# Patient Record
Sex: MALE | Race: WHITE | NOT HISPANIC OR LATINO
[De-identification: names, ages, dates, MRNs, and addresses within clinical notes are randomized per-mention and may not be internally consistent; named-entity substitution may affect disease eponyms.]

---

## 2019-03-19 PROBLEM — Z00.00 ENCOUNTER FOR PREVENTIVE HEALTH EXAMINATION: Status: ACTIVE | Noted: 2019-03-19

## 2019-05-14 ENCOUNTER — APPOINTMENT (OUTPATIENT)
Dept: VASCULAR SURGERY | Facility: HOSPITAL | Age: 79
End: 2019-05-14
Payer: MEDICARE

## 2019-05-14 PROCEDURE — 35301 RECHANNELING OF ARTERY: CPT | Mod: LT

## 2019-05-14 PROCEDURE — 35301 RECHANNELING OF ARTERY: CPT | Mod: 80,LT

## 2019-05-29 ENCOUNTER — APPOINTMENT (OUTPATIENT)
Dept: VASCULAR SURGERY | Facility: CLINIC | Age: 79
End: 2019-05-29
Payer: MEDICARE

## 2019-05-29 VITALS
DIASTOLIC BLOOD PRESSURE: 68 MMHG | HEIGHT: 66 IN | SYSTOLIC BLOOD PRESSURE: 122 MMHG | BODY MASS INDEX: 29.73 KG/M2 | WEIGHT: 185 LBS

## 2019-05-29 DIAGNOSIS — I25.10 ATHEROSCLEROTIC HEART DISEASE OF NATIVE CORONARY ARTERY W/OUT ANGINA PECTORIS: ICD-10-CM

## 2019-05-29 DIAGNOSIS — I10 ESSENTIAL (PRIMARY) HYPERTENSION: ICD-10-CM

## 2019-05-29 DIAGNOSIS — J44.9 CHRONIC OBSTRUCTIVE PULMONARY DISEASE, UNSPECIFIED: ICD-10-CM

## 2019-05-29 DIAGNOSIS — E03.9 HYPOTHYROIDISM, UNSPECIFIED: ICD-10-CM

## 2019-05-29 DIAGNOSIS — Z86.39 PERSONAL HISTORY OF OTHER ENDOCRINE, NUTRITIONAL AND METABOLIC DISEASE: ICD-10-CM

## 2019-05-29 PROCEDURE — 99024 POSTOP FOLLOW-UP VISIT: CPT

## 2019-05-29 RX ORDER — METOPROLOL SUCCINATE 25 MG/1
25 TABLET, EXTENDED RELEASE ORAL
Refills: 0 | Status: ACTIVE | COMMUNITY

## 2019-05-29 RX ORDER — ALLOPURINOL 100 MG/1
100 TABLET ORAL
Refills: 0 | Status: ACTIVE | COMMUNITY

## 2019-05-29 RX ORDER — ROSUVASTATIN CALCIUM 40 MG/1
40 TABLET, FILM COATED ORAL
Refills: 0 | Status: ACTIVE | COMMUNITY

## 2019-05-29 RX ORDER — ASPIRIN 81 MG/1
81 TABLET, COATED ORAL
Refills: 0 | Status: ACTIVE | COMMUNITY

## 2019-05-29 NOTE — PHYSICAL EXAM
[Wheezing] : wheezing was heard [Alert] : alert [Oriented to Person] : oriented to person [Oriented to Place] : oriented to place [Oriented to Time] : oriented to time [de-identified] : Awake and Alert [de-identified] : left neck incision healing appropriately, no hematoma [de-identified] : tongue midline, no gross motor or sensory deficits [de-identified] : appropriate

## 2019-05-29 NOTE — REVIEW OF SYSTEMS
[Shortness Of Breath] : shortness of breath [Difficulty Walking] : difficulty walking [Fever] : no fever [Chills] : no chills [Feeling Poorly] : not feeling poorly [Feeling Tired] : not feeling tired

## 2019-05-29 NOTE — REASON FOR VISIT
[Spouse] : spouse [de-identified] : s/p left carotid endarterectomy [de-identified] : 77 yo male s/p a left carotid endarterectomy for symptomatic carotid stenosis. He reports that he is doing well. He denies fever or chills. He has mild incisional tenderness. He is anxious to restart pulmonary rehab.

## 2019-06-28 ENCOUNTER — APPOINTMENT (OUTPATIENT)
Dept: VASCULAR SURGERY | Facility: CLINIC | Age: 79
End: 2019-06-28
Payer: MEDICARE

## 2019-06-28 PROCEDURE — 99024 POSTOP FOLLOW-UP VISIT: CPT

## 2019-07-12 ENCOUNTER — APPOINTMENT (OUTPATIENT)
Dept: VASCULAR SURGERY | Facility: CLINIC | Age: 79
End: 2019-07-12
Payer: MEDICARE

## 2019-07-12 PROCEDURE — 99213 OFFICE O/P EST LOW 20 MIN: CPT | Mod: 24

## 2019-08-06 ENCOUNTER — RESULT REVIEW (OUTPATIENT)
Age: 79
End: 2019-08-06

## 2019-08-06 ENCOUNTER — APPOINTMENT (OUTPATIENT)
Dept: VASCULAR SURGERY | Facility: HOSPITAL | Age: 79
End: 2019-08-06
Payer: MEDICARE

## 2019-08-06 PROCEDURE — 75716 ARTERY X-RAYS ARMS/LEGS: CPT | Mod: 26,59

## 2019-08-06 PROCEDURE — 37222: CPT

## 2019-08-06 PROCEDURE — 75625 CONTRAST EXAM ABDOMINL AORTA: CPT | Mod: 26,80

## 2019-08-06 PROCEDURE — XXXXX: CPT

## 2019-08-06 PROCEDURE — 75716 ARTERY X-RAYS ARMS/LEGS: CPT | Mod: 26,80

## 2019-08-06 PROCEDURE — 75625 CONTRAST EXAM ABDOMINL AORTA: CPT | Mod: 26

## 2019-08-06 PROCEDURE — 37220: CPT | Mod: 58

## 2019-08-21 ENCOUNTER — APPOINTMENT (OUTPATIENT)
Dept: VASCULAR SURGERY | Facility: CLINIC | Age: 79
End: 2019-08-21
Payer: MEDICARE

## 2019-08-21 DIAGNOSIS — I49.9 CARDIAC ARRHYTHMIA, UNSPECIFIED: ICD-10-CM

## 2019-08-21 DIAGNOSIS — J43.9 EMPHYSEMA, UNSPECIFIED: ICD-10-CM

## 2019-08-21 PROCEDURE — 99214 OFFICE O/P EST MOD 30 MIN: CPT

## 2019-08-22 PROBLEM — I49.9 VENTRICULAR ARRHYTHMIA: Status: RESOLVED | Noted: 2019-08-22 | Resolved: 2019-08-22

## 2019-08-22 PROBLEM — J43.9 COPD (CHRONIC OBSTRUCTIVE PULMONARY DISEASE) WITH EMPHYSEMA: Status: ACTIVE | Noted: 2019-08-22

## 2019-08-22 NOTE — ASSESSMENT
[FreeTextEntry1] : 77 yo male with multiple medical problems and severe PAD. I believe he requires bilateral iliac artery stenting and common femoral endarterectomies for limb salvage.  He requires medical optimization. I will discuss this with the patient's other physicians. In the meantime he will continue local wound care and undergo a hyperbaric evaluation.

## 2019-08-22 NOTE — HISTORY OF PRESENT ILLNESS
[FreeTextEntry1] : 79 yo male well known to me with multiple medical problems. He is s/p a bilateral lowr extremity angiogram for nonhealing ulceration bl. He was found to have a severe stenosis of the origin of the left HEMANT and bilateral common femoral occlusions.. He presents today to discuss further intervention. He is scheduled for a hyperbaric evaluation tomorrow at [facility]. He continues to undergo local wound care. He denies fever or chills.

## 2019-08-22 NOTE — REVIEW OF SYSTEMS
[Fever] : no fever [Chills] : no chills [Shortness Of Breath] : shortness of breath [Limb Pain] : limb pain [Limb Swelling] : limb swelling [Limb Weakness] : limb weakness [Difficulty Walking] : difficulty walking

## 2019-08-22 NOTE — PHYSICAL EXAM
[Normal Breath Sounds] : Normal breath sounds [Normal Rate and Rhythm] : normal rate and rhythm [0] : left 0 [Ankle Swelling (On Exam)] : present [Ankle Swelling Bilaterally] : bilaterally  [] : bilaterally [Ankle Swelling On The Left] : moderate [Skin Ulcer] : ulcer [Alert] : alert [Oriented to Place] : oriented to place [Oriented to Person] : oriented to person [Oriented to Time] : oriented to time [de-identified] : Awake and Alert [de-identified] : bilateral charcot deformity, left foot with heel ulceration, right foot with midfoot ulceration [de-identified] : appropriate

## 2019-09-25 ENCOUNTER — APPOINTMENT (OUTPATIENT)
Dept: VASCULAR SURGERY | Facility: CLINIC | Age: 79
End: 2019-09-25
Payer: MEDICARE

## 2019-09-25 VITALS — DIASTOLIC BLOOD PRESSURE: 76 MMHG | HEART RATE: 58 BPM | SYSTOLIC BLOOD PRESSURE: 170 MMHG

## 2019-09-25 PROCEDURE — 99213 OFFICE O/P EST LOW 20 MIN: CPT

## 2019-09-25 NOTE — PHYSICAL EXAM
[Normal Breath Sounds] : Normal breath sounds [Normal Rate and Rhythm] : normal rate and rhythm [0] : left 0 [Ankle Swelling (On Exam)] : present [] : bilaterally [Ankle Swelling Bilaterally] : bilaterally  [Ankle Swelling On The Left] : moderate [Skin Ulcer] : ulcer [Alert] : alert [Oriented to Person] : oriented to person [Oriented to Time] : oriented to time [Oriented to Place] : oriented to place [de-identified] : bilateral charcot deformity, left foot with heel ulceration, right  midfoot ulceration healed [de-identified] : Awake and Alert [de-identified] : appropriate

## 2019-09-25 NOTE — ASSESSMENT
[FreeTextEntry1] : 79 yo male with multiple medical problems and severe PAD. I believe he requires bilateral iliac artery stenting and common femoral endarterectomies for limb salvage. I discussed this in detail with the patient and his wife. I recommended that we make a decision on how to proceed after his appointment with the EP next week.\par \par We will speak next week after EP consult.

## 2019-09-25 NOTE — HISTORY OF PRESENT ILLNESS
[FreeTextEntry1] : 79 yo male well known to me with multiple medical problems. He is s/p a bilateral lower extremity angiogram for nonhealing ulceration bl. He was found to have a severe stenosis of the origin of the left HEMANT and bilateral common femoral occlusions.. He presents today to discuss further intervention. He was seen for a hyperbaric evaluation. He was determined not to be a hyperbaric candidate. He is scheduled to see a EP early next week.  He continues to undergo local wound care. He denies fever or chills.

## 2019-09-25 NOTE — CONSULT LETTER
[Dear  ___] : Dear ~SHANE, [Consult Letter:] : I had the pleasure of evaluating your patient, [unfilled]. [Please see my note below.] : Please see my note below. [Consult Closing:] : Thank you very much for allowing me to participate in the care of this patient.  If you have any questions, please do not hesitate to contact me. [FreeTextEntry3] : Abhishek [FreeTextEntry2] : mimi Stewart [Sincerely,] : Sincerely,

## 2019-09-25 NOTE — REVIEW OF SYSTEMS
[Fever] : no fever [Chills] : no chills [Shortness Of Breath] : shortness of breath [Limb Swelling] : limb swelling [Limb Pain] : limb pain [Difficulty Walking] : difficulty walking [Limb Weakness] : limb weakness

## 2020-01-24 ENCOUNTER — APPOINTMENT (OUTPATIENT)
Dept: VASCULAR SURGERY | Facility: CLINIC | Age: 80
End: 2020-01-24
Payer: MEDICARE

## 2020-01-24 PROCEDURE — 99214 OFFICE O/P EST MOD 30 MIN: CPT

## 2020-02-03 ENCOUNTER — RESULT REVIEW (OUTPATIENT)
Age: 80
End: 2020-02-03

## 2020-02-04 ENCOUNTER — APPOINTMENT (OUTPATIENT)
Dept: VASCULAR SURGERY | Facility: HOSPITAL | Age: 80
End: 2020-02-04
Payer: MEDICARE

## 2020-02-04 ENCOUNTER — RESULT REVIEW (OUTPATIENT)
Age: 80
End: 2020-02-04

## 2020-02-04 PROCEDURE — 35371 RECHANNELING OF ARTERY: CPT | Mod: LT

## 2020-02-04 PROCEDURE — 75710 ARTERY X-RAYS ARM/LEG: CPT | Mod: 26,59

## 2020-02-04 PROCEDURE — 37226: CPT | Mod: LT

## 2020-02-04 PROCEDURE — 37221: CPT | Mod: LT,59

## 2020-02-26 ENCOUNTER — APPOINTMENT (OUTPATIENT)
Dept: VASCULAR SURGERY | Facility: CLINIC | Age: 80
End: 2020-02-26
Payer: MEDICARE

## 2020-02-26 PROCEDURE — 99024 POSTOP FOLLOW-UP VISIT: CPT

## 2020-02-26 NOTE — REASON FOR VISIT
[Spouse] : spouse [Family Member] : family member [de-identified] : s/p left femoral endarterectomy, bilateral iliac artery stenting and left SFA stent [de-identified] : 78 yo male s/p a  left femoral endarterectomy, bilateral iliac artery stenting and left SFA stent for a nonhealing ulceration of the left heel. He reports that he is currently undergoing local wound care to the foot [facility]. He reports that his left heel pain has improved. He denies fever or chills.

## 2020-02-26 NOTE — DISCUSSION/SUMMARY
[FreeTextEntry1] : 79-year-old male doing well status post revascularization for nonhealing wound of his left heel. The patient is a 2+ posterior tibial and dorsalis pedal pulse. His heel wound is now clean and granulating.\par \par Continue local wound care\par Continue aspirin and Plavix daily\par Continue rehabilitation\par Followup in one month

## 2020-02-26 NOTE — REVIEW OF SYSTEMS
[Limb Weakness] : limb weakness [Difficulty Walking] : difficulty walking [Fever] : no fever [Limb Pain] : no limb pain [Chills] : no chills [Limb Swelling] : no limb swelling

## 2020-02-26 NOTE — PHYSICAL EXAM
[Wheezing] : wheezing was heard [2+] : left 2+ [Ankle Swelling Bilaterally] : bilaterally  [Alert] : alert [Oriented to Person] : oriented to person [Oriented to Time] : oriented to time [Oriented to Place] : oriented to place [JVD] : no jugular venous distention  [Ankle Swelling (On Exam)] : not present [de-identified] : Awake and Alert [de-identified] : left heel clean and granulating, left groin incision intact - staples removed [de-identified] : appropriate

## 2020-03-25 ENCOUNTER — APPOINTMENT (OUTPATIENT)
Dept: VASCULAR SURGERY | Facility: CLINIC | Age: 80
End: 2020-03-25

## 2020-07-29 ENCOUNTER — APPOINTMENT (OUTPATIENT)
Dept: VASCULAR SURGERY | Facility: CLINIC | Age: 80
End: 2020-07-29
Payer: MEDICARE

## 2020-07-29 VITALS — SYSTOLIC BLOOD PRESSURE: 92 MMHG | HEART RATE: 123 BPM | DIASTOLIC BLOOD PRESSURE: 60 MMHG

## 2020-07-29 PROCEDURE — 99214 OFFICE O/P EST MOD 30 MIN: CPT | Mod: 25

## 2020-07-29 PROCEDURE — 10060 I&D ABSCESS SIMPLE/SINGLE: CPT

## 2020-07-29 PROCEDURE — 93922 UPR/L XTREMITY ART 2 LEVELS: CPT

## 2020-07-29 PROCEDURE — 93880 EXTRACRANIAL BILAT STUDY: CPT

## 2020-07-29 NOTE — REVIEW OF SYSTEMS
[Lower Ext Edema] : lower extremity edema [Skin Wound] : skin wound [Limb Swelling] : limb swelling [As Noted in HPI] : as noted in HPI [Fever] : no fever [Chills] : no chills [Limb Pain] : no limb pain

## 2020-07-29 NOTE — ASSESSMENT
[FreeTextEntry1] : 80 yo male with multiple medical problems. His most pressing problem right now is his toe ulceration. I do not think it will heal without vascular intervention. I discussed this with the patient. He would like to attempt conservative treatment. He was prescribed antibiotics and will continue to apply antibiotic ointment. I recommended that he begin to see his cardiologist and pulmonologist for preop evaluation. He understands he is at risk for an amputation.\par \par Carotid Stenosis - High grade asymptomatic right carotid stenosis. Given his current foot infection will defer treatment for now. Continue risk factor modification.

## 2020-07-29 NOTE — DATA REVIEWED
[FreeTextEntry1] : NEGRITO / PVR - 1.15 left, .46 right\par \par Carotid Duplex -  > 80% stenosis right, widely patent post CEA on the left

## 2020-07-29 NOTE — PHYSICAL EXAM
[Carotid Bruits] : carotid bruit  [Wheezing] : wheezing was heard [Ankle Swelling (On Exam)] : present [2+] : left 2+ [Ankle Swelling Bilaterally] : bilaterally  [Ankle Swelling On The Right] : mild [Alert] : alert [Oriented to Place] : oriented to place [Oriented to Person] : oriented to person [Oriented to Time] : oriented to time [de-identified] : Awake and Alert [FreeTextEntry1] : biphasic dp left [de-identified] : well healed carotid scar [de-identified] : toe with small ulceration with purulent drainage - I and D performed, ulceration to DIP joint [de-identified] : appropriate

## 2020-07-29 NOTE — CONSULT LETTER
[Dear  ___] : Dear  [unfilled], [Consult Letter:] : I had the pleasure of evaluating your patient, [unfilled]. [Please see my note below.] : Please see my note below. [Consult Closing:] : Thank you very much for allowing me to participate in the care of this patient.  If you have any questions, please do not hesitate to contact me. [Sincerely,] : Sincerely, [FreeTextEntry2] : Penny So [FreeTextEntry3] : Wild Jackson MD, RPVI\par Chief, Vascular Surgery\par

## 2020-07-29 NOTE — HISTORY OF PRESENT ILLNESS
[FreeTextEntry1] : 78 yo male well known to me. He has multiple medical problems. He is s/p a left carotid endarterectomy in 2019. He has a known asymptomatic right carotid stenosis. He continues to deny amaurosis fugax, motor or sensory deficits. \par \par The patient is s/p bilateral iliac artery stenting and a left common femoral endarterectomy for a nonhealing left foot ulcer. He reports that he developed a new ulcer of his right foot yesterday. He has been applying a antibiotic ointment. He denies fever or chills. He has an appointment with his podiatrist tomorrow.

## 2020-08-19 ENCOUNTER — APPOINTMENT (OUTPATIENT)
Dept: VASCULAR SURGERY | Facility: CLINIC | Age: 80
End: 2020-08-19
Payer: MEDICARE

## 2020-08-19 VITALS — DIASTOLIC BLOOD PRESSURE: 68 MMHG | HEART RATE: 62 BPM | SYSTOLIC BLOOD PRESSURE: 150 MMHG

## 2020-08-19 PROCEDURE — 99214 OFFICE O/P EST MOD 30 MIN: CPT

## 2020-08-19 NOTE — ASSESSMENT
[FreeTextEntry1] : 80 yo male with multiple medical problems. He has severe PAD and gangrene of the right toe. It has failed conservative management. I recommended that he undergo a femoral endarterectomy and possible intervention. He will also require a toe amputation. The risks and benefits of the procedure were discussed with the patient and his spouse. They agree to proceed.

## 2020-08-19 NOTE — PHYSICAL EXAM
[Carotid Bruits] : carotid bruit  [Wheezing] : wheezing was heard [2+] : left 2+ [0] : right 0 [Ankle Swelling (On Exam)] : present [Ankle Swelling Bilaterally] : bilaterally  [Ankle Swelling On The Right] : mild [Alert] : alert [Oriented to Person] : oriented to person [Oriented to Place] : oriented to place [Oriented to Time] : oriented to time [de-identified] : Awake and Alert [de-identified] : well healed carotid scar [de-identified] : appropriate [de-identified] : toe with exposed dip joint and distal gangrene [FreeTextEntry1] : biphasic dp left

## 2020-08-19 NOTE — HISTORY OF PRESENT ILLNESS
[FreeTextEntry1] : 78 yo male well known to me. He has multiple medical problems. He is s/p a left carotid endarterectomy in 2019. He has a known asymptomatic right carotid stenosis. He continues to deny amaurosis fugax, motor or sensory deficits. \par \par The patient is s/p bilateral iliac artery stenting and a left common femoral endarterectomy for a nonhealing left foot ulcer. He reports that he developed a new ulcer of his right foot yesterday. He has been applying a antibiotic ointment. He denies fever or chills. He has an appointment with his podiatrist tomorrow.  [de-identified] : The patient returns in follow up for a nonhealing right toe ulcer. He has been applying Betadine to the toe. He is currently taking antibiotics as prescribed. He denies fever or chills. He was seen by Dr. Stewart and Dr. Mckenzie since his last appointment.

## 2020-08-19 NOTE — REVIEW OF SYSTEMS
[Chills] : no chills [Fever] : no fever [Lower Ext Edema] : lower extremity edema [Limb Swelling] : limb swelling [Limb Pain] : no limb pain [As Noted in HPI] : as noted in HPI [Skin Wound] : skin wound

## 2020-08-29 ENCOUNTER — RESULT REVIEW (OUTPATIENT)
Age: 80
End: 2020-08-29

## 2020-08-31 ENCOUNTER — RESULT REVIEW (OUTPATIENT)
Age: 80
End: 2020-08-31

## 2020-09-01 ENCOUNTER — RESULT REVIEW (OUTPATIENT)
Age: 80
End: 2020-09-01

## 2020-09-01 ENCOUNTER — APPOINTMENT (OUTPATIENT)
Dept: VASCULAR SURGERY | Facility: HOSPITAL | Age: 80
End: 2020-09-01
Payer: MEDICARE

## 2020-09-01 PROCEDURE — 34201 REMOVAL OF ARTERY CLOT: CPT | Mod: RT

## 2020-09-01 PROCEDURE — 37226: CPT | Mod: RT

## 2020-09-01 PROCEDURE — 28810 AMPUTATION TOE & METATARSAL: CPT | Mod: RT

## 2020-09-09 ENCOUNTER — RESULT REVIEW (OUTPATIENT)
Age: 80
End: 2020-09-09

## 2020-09-23 DIAGNOSIS — I95.81 POSTPROCEDURAL HYPOTENSION: ICD-10-CM

## 2020-09-23 RX ORDER — CLOPIDOGREL BISULFATE 75 MG/1
75 TABLET, FILM COATED ORAL DAILY
Qty: 30 | Refills: 6 | Status: ACTIVE | COMMUNITY
Start: 2020-09-23 | End: 1900-01-01

## 2020-10-02 ENCOUNTER — APPOINTMENT (OUTPATIENT)
Dept: VASCULAR SURGERY | Facility: CLINIC | Age: 80
End: 2020-10-02
Payer: MEDICARE

## 2020-10-02 PROCEDURE — 99024 POSTOP FOLLOW-UP VISIT: CPT

## 2020-10-02 NOTE — PHYSICAL EXAM
[2+] : left 2+ [Alert] : alert [Oriented to Person] : oriented to person [Oriented to Place] : oriented to place [Oriented to Time] : oriented to time [de-identified] : Awake and Alert [de-identified] : toe amputation site healed - sutures removed, groin staples removed - some skin dehiscence  [de-identified] : appropriate

## 2020-10-02 NOTE — REASON FOR VISIT
[de-identified] : s/p right femoral endarterectomy, SFA PTA and stent and toe amputation [de-identified] : 78 yo male s/p revascularization of his right lower extremity and toe amputation returns in follow up. He reports that he is being seen by [facility]. He reports that a dry dressing has been placed in his groin. He denies fever or chills. He is taking Aspirin and Plavix as prescribed. He has not seen Dr. Stewart or Dr. Guaman since his discharge. He has an appointment with Dr. Stewart next week. He denies fever or chills.  [Spouse] : spouse [Formal Caregiver] : formal caregiver

## 2020-10-02 NOTE — DISCUSSION/SUMMARY
[FreeTextEntry1] : 80 yo male s/p revascularization and toe amputation. He has a 2+ pt pulse on the right. His amputation site is well healed. His groin wound has a small area of dehiscence. I recommended that he place a new dry gauze in his groin daily. He will follow up with me in 2 weeks.\par \par

## 2020-10-16 ENCOUNTER — APPOINTMENT (OUTPATIENT)
Dept: VASCULAR SURGERY | Facility: CLINIC | Age: 80
End: 2020-10-16
Payer: MEDICARE

## 2020-10-16 VITALS
DIASTOLIC BLOOD PRESSURE: 44 MMHG | TEMPERATURE: 96.6 F | SYSTOLIC BLOOD PRESSURE: 104 MMHG | RESPIRATION RATE: 16 BRPM | HEART RATE: 64 BPM | HEIGHT: 66 IN

## 2020-10-16 PROCEDURE — 99024 POSTOP FOLLOW-UP VISIT: CPT

## 2020-10-16 RX ORDER — SPIRONOLACTONE 25 MG/1
25 TABLET ORAL
Refills: 0 | Status: DISCONTINUED | COMMUNITY
End: 2020-10-16

## 2020-10-16 NOTE — REVIEW OF SYSTEMS
[Fever] : no fever [Chills] : no chills [Shortness Of Breath] : shortness of breath [Limb Pain] : no limb pain [Limb Weakness] : limb weakness [Limb Swelling] : no limb swelling [Difficulty Walking] : difficulty walking

## 2020-10-16 NOTE — DISCUSSION/SUMMARY
[FreeTextEntry1] : 80 yo male s/p revascularization and toe amputation. He has a 2+ pt pulse on the right. His amputation site is well healed. His groin wound is basically healed. I recommended that he place a new dry gauze in his groin daily. He will follow up with me in 6 weeks.\par \par

## 2020-10-16 NOTE — PHYSICAL EXAM
[2+] : right 2+ [Oriented to Person] : oriented to person [Alert] : alert [Oriented to Place] : oriented to place [de-identified] : Awake and Alert [Oriented to Time] : oriented to time [de-identified] : toe amputation site healed , groin basically healed  [de-identified] : appropriate

## 2020-10-16 NOTE — REASON FOR VISIT
[de-identified] : s/p right femoral endarterectomy, SFA PTA and stent and toe amputation [Spouse] : spouse [de-identified] : 80 yo male s/p revascularization of his right lower extremity and toe amputation returns in follow up. He reports that  his left groin is healed. He reports that he has been applying a dry dressing  in his groin. He denies fever or chills. He is taking Aspirin and Plavix as prescribed. He saw Dr. Stewart last week. He denies fever or chills.  [Formal Caregiver] : formal caregiver

## 2020-11-30 ENCOUNTER — APPOINTMENT (OUTPATIENT)
Dept: VASCULAR SURGERY | Facility: CLINIC | Age: 80
End: 2020-11-30
Payer: MEDICARE

## 2020-11-30 PROCEDURE — 99213 OFFICE O/P EST LOW 20 MIN: CPT | Mod: 24

## 2020-11-30 NOTE — REVIEW OF SYSTEMS
[Fever] : no fever [Chills] : no chills [Shortness Of Breath] : shortness of breath [Limb Pain] : limb pain [Limb Swelling] : no limb swelling [Limb Weakness] : limb weakness [Difficulty Walking] : difficulty walking [Easy Bleeding] : a tendency for easy bleeding [Easy Bruising] : a tendency for easy bruising

## 2020-11-30 NOTE — PHYSICAL EXAM
[2+] : left 2+ [Alert] : alert [Oriented to Person] : oriented to person [Oriented to Place] : oriented to place [Oriented to Time] : oriented to time [de-identified] : Awake and Alert [de-identified] : bilateral groins healed, no ulceration or skin breakdown bilateral feet [de-identified] : appropriate

## 2020-11-30 NOTE — DISCUSSION/SUMMARY
[FreeTextEntry1] : 78 yo male s/p revascularization and toe amputation on the right with new onset radiating leg pain. I believe the pain is musculoskeletal. I do not think it is vascular in etiology. I recommended that he take Tylenol for the pain.\par \par Easy bruising and bleeding - I will discuss stopping either Aspirin or Plavix with Dr. Stewart.  \par \par Follow up in 3 -6 months for arterial testing.\par \par

## 2020-11-30 NOTE — REASON FOR VISIT
[de-identified] : s/p right femoral endarterectomy, SFA PTA and stent and toe amputation [de-identified] : 80 yo male s/p revascularization of his right lower extremity and toe amputation returns for emergency follow up. He reports that he has been having pain radiating down the lateral aspect of his leg. The pain also radiates to his groin. He reports that it is slowly improving. He has been taking Tylenol for the pain. He also reports that he has been bruising very easily and that his legs have been bleeding frequently. He would like to stop taking Plavix.  [Spouse] : spouse [Formal Caregiver] : formal caregiver

## 2021-04-12 ENCOUNTER — APPOINTMENT (OUTPATIENT)
Dept: VASCULAR SURGERY | Facility: CLINIC | Age: 81
End: 2021-04-12
Payer: MEDICARE

## 2021-04-12 VITALS
OXYGEN SATURATION: 98 % | HEART RATE: 72 BPM | RESPIRATION RATE: 20 BRPM | DIASTOLIC BLOOD PRESSURE: 54 MMHG | SYSTOLIC BLOOD PRESSURE: 132 MMHG

## 2021-04-12 PROCEDURE — 93922 UPR/L XTREMITY ART 2 LEVELS: CPT

## 2021-04-12 PROCEDURE — 99214 OFFICE O/P EST MOD 30 MIN: CPT

## 2021-04-12 NOTE — ASSESSMENT
[FreeTextEntry1] : PAD - bilateral feet warm and well perfused. He has no ulceration or skin breakdown. Normal ABIs bl. COntinue current medication regiment. Continue careful diabetic foot care.\par \par Carotid stenosis - the patient is s/p a left carotid endarterectomy. He has a know right carotid stenosis. I recommended that he undergo a carotid duplex. He will follow up with me when the results are available.

## 2021-04-12 NOTE — REVIEW OF SYSTEMS
[Fever] : no fever [Chills] : no chills [Lower Ext Edema] : no extremity edema [Limb Pain] : no limb pain [Limb Swelling] : no limb swelling [Limb Weakness] : limb weakness [Difficulty Walking] : difficulty walking

## 2021-04-12 NOTE — PHYSICAL EXAM
[JVD] : no jugular venous distention  [Carotid Bruits] : carotid bruit  [Normal Breath Sounds] : Normal breath sounds [Normal Rate and Rhythm] : normal rate and rhythm [0] : right 0 [1+] : left 1+ [Ankle Swelling (On Exam)] : not present [Ankle Swelling Bilaterally] : bilaterally  [Alert] : alert [Oriented to Person] : oriented to person [Oriented to Place] : oriented to place [Oriented to Time] : oriented to time [de-identified] : Awake and Alert [de-identified] : bilateral charcot deformities, bilateral feet warm, well healed toe amputation site [de-identified] : Appropriate

## 2021-04-12 NOTE — HISTORY OF PRESENT ILLNESS
[FreeTextEntry1] : 79 yo male well known to me. The patient is s/p bilateral femoral endarterectomies and a left carotid endarterectomy. His most recent procedure was a right femoral endarterectomy and a right 4th toe amputation. The paitent tolerated the procedure well and his toe amputation site has healed. He is remains on an Asprin and a statin.\par \par Carotid stenosis - He denies amaurosis fugax, motor or sensory deficits.

## 2021-07-12 ENCOUNTER — APPOINTMENT (OUTPATIENT)
Dept: VASCULAR SURGERY | Facility: CLINIC | Age: 81
End: 2021-07-12
Payer: MEDICARE

## 2021-07-12 VITALS — DIASTOLIC BLOOD PRESSURE: 70 MMHG | HEART RATE: 58 BPM | SYSTOLIC BLOOD PRESSURE: 137 MMHG

## 2021-07-12 PROCEDURE — 99214 OFFICE O/P EST MOD 30 MIN: CPT

## 2021-07-12 PROCEDURE — 93880 EXTRACRANIAL BILAT STUDY: CPT

## 2021-07-13 NOTE — DATA REVIEWED
[FreeTextEntry1] : Carotid Duplex - left carotid widely patent post CEA, right carotid moderate to severe stenosis. ICA to CCA ratio > 4 EDV < 100

## 2021-07-13 NOTE — PHYSICAL EXAM
[JVD] : no jugular venous distention  [Carotid Bruits] : carotid bruit  [Normal Breath Sounds] : Normal breath sounds [Normal Rate and Rhythm] : normal rate and rhythm [0] : right 0 [1+] : left 1+ [Ankle Swelling (On Exam)] : not present [Ankle Swelling Bilaterally] : bilaterally  [Alert] : alert [Oriented to Person] : oriented to person [Oriented to Place] : oriented to place [Oriented to Time] : oriented to time [de-identified] : Awake and Alert [FreeTextEntry1] : biphasic signals right foot [de-identified] : bilateral charcot deformities, bilateral feet warm, well healed toe amputation site [de-identified] : Appropriate

## 2021-07-13 NOTE — HISTORY OF PRESENT ILLNESS
[FreeTextEntry1] : 79 yo male well known to me. The patient is s/p bilateral femoral endarterectomies and a left carotid endarterectomy. His most recent procedure was a right femoral endarterectomy and a right 4th toe amputation. The patient tolerated the procedure well and his toe amputation site has healed. He is remains on an Aspirin and a statin.\par \par Carotid stenosis - He denies amaurosis fugax, motor or sensory deficits.  [de-identified] : 79 yo male well known to me returns in follow up. He is s/p bilateral femoral endarterectomies and a left carotid intervention. He presents today for follow up. He reports that he is doing well. He denies pain or ulcerations in his feet. He has a known high grade right carotid stenosis. He continues to deny amaurosis fugax, motor or sensory deficits. He continues to take a statin as prescribed.

## 2021-07-13 NOTE — ASSESSMENT
[FreeTextEntry1] : PAD - bilateral feet warm and well perfused. He has no ulceration or skin breakdown. Continue current medication regiment. Continue careful diabetic foot care.\par \par Carotid stenosis - the patient is s/p a left carotid endarterectomy. He has a know right carotid stenosis. The stenosis is asymptomatic. He underwent a carotid duplex which demonstrated a widely patent post CEA carotid on the left and a high grade carotid stenosis on the right.  I discussed the findings in detail with the patient. Given his comorbidities I think it is reasonable to continue to treat him with medical management.\par \par Follow up in 6 months sooner if a problem develops.\par \par Continue Aspirin and Crestor

## 2022-01-10 ENCOUNTER — APPOINTMENT (OUTPATIENT)
Dept: VASCULAR SURGERY | Facility: CLINIC | Age: 82
End: 2022-01-10

## 2022-02-07 ENCOUNTER — APPOINTMENT (OUTPATIENT)
Dept: VASCULAR SURGERY | Facility: CLINIC | Age: 82
End: 2022-02-07
Payer: MEDICARE

## 2022-02-07 VITALS — HEART RATE: 63 BPM | SYSTOLIC BLOOD PRESSURE: 104 MMHG | DIASTOLIC BLOOD PRESSURE: 62 MMHG

## 2022-02-07 DIAGNOSIS — I65.22 OCCLUSION AND STENOSIS OF LEFT CAROTID ARTERY: ICD-10-CM

## 2022-02-07 DIAGNOSIS — I65.21 OCCLUSION AND STENOSIS OF RIGHT CAROTID ARTERY: ICD-10-CM

## 2022-02-07 PROCEDURE — 99213 OFFICE O/P EST LOW 20 MIN: CPT

## 2022-02-07 PROCEDURE — 93880 EXTRACRANIAL BILAT STUDY: CPT

## 2022-02-07 RX ORDER — AMOXICILLIN AND CLAVULANATE POTASSIUM 500; 125 MG/1; MG/1
500-125 TABLET, FILM COATED ORAL
Qty: 20 | Refills: 0 | Status: DISCONTINUED | COMMUNITY
Start: 2020-07-29 | End: 2022-02-07

## 2022-02-08 PROBLEM — I65.21 ASYMPTOMATIC STENOSIS OF RIGHT CAROTID ARTERY: Status: ACTIVE | Noted: 2021-04-12

## 2022-02-08 PROBLEM — I65.22 CAROTID STENOSIS, SYMPTOMATIC W/O INFARCT, LEFT: Status: ACTIVE | Noted: 2019-05-29

## 2022-02-08 NOTE — REVIEW OF SYSTEMS
[Fever] : no fever [Chills] : no chills [Lower Ext Edema] : no extremity edema [Limb Pain] : no limb pain [Limb Swelling] : no limb swelling [Skin Wound] : skin wound [As Noted in HPI] : as noted in HPI [Limb Weakness] : limb weakness [Difficulty Walking] : difficulty walking

## 2022-02-08 NOTE — HISTORY OF PRESENT ILLNESS
[FreeTextEntry1] : 81 yo male well known to me. The patient is s/p bilateral femoral endarterectomies and a left carotid endarterectomy. His most recent procedure was a right femoral endarterectomy and a right 4th toe amputation. The patient tolerated the procedure well and his toe amputation site has healed. He is remains on an Aspirin and a statin.\par \par Carotid stenosis - He denies amaurosis fugax, motor or sensory deficits.  [de-identified] : 82 yo male well known to me returns in follow up. He is s/p bilateral femoral endarterectomies and a left carotid intervention. He presents today for an emergency visit. He reports that he developed a superficial heel ulceration several weeks ago. He has been applying Bacitracin to the wound. He denies fever or chills. He has a known high grade right carotid stenosis. He continues to deny amaurosis fugax, motor or sensory deficits. He continues to take a statin and Plavix prescribed.

## 2022-02-08 NOTE — ASSESSMENT
[FreeTextEntry1] : PAD with ulceration - The patient has a new ulceration of the right heel. The ulceration is superficial. It appears to be a decubitus ulcer. I recommended that we attempt to treat the ulcer conservatively. I recommended that he offload the heel as much as possible. He was started on abx. If the wound does not heal with local wound care and offloading he will require an angiogram and possible intervention for limb salvage. \par \par Carotid stenosis - the patient is s/p a left carotid endarterectomy. He has a know right carotid stenosis. The stenosis remains asymptomatic. He underwent a carotid duplex which demonstrated a widely patent post CEA carotid on the left and a high grade carotid stenosis on the right.  I discussed the findings in detail with the patient. Given his comorbidities I think it is reasonable to continue to treat him with medical management.\par \par Follow up n 2 weeks. \par \par

## 2022-02-28 ENCOUNTER — APPOINTMENT (OUTPATIENT)
Dept: VASCULAR SURGERY | Facility: CLINIC | Age: 82
End: 2022-02-28
Payer: MEDICARE

## 2022-02-28 VITALS — DIASTOLIC BLOOD PRESSURE: 62 MMHG | HEART RATE: 74 BPM | SYSTOLIC BLOOD PRESSURE: 109 MMHG

## 2022-02-28 PROCEDURE — 99213 OFFICE O/P EST LOW 20 MIN: CPT

## 2022-02-28 NOTE — HISTORY OF PRESENT ILLNESS
[FreeTextEntry1] : 81 yo male well known to me. The patient is s/p bilateral femoral endarterectomies and a left carotid endarterectomy. His most recent procedure was a right femoral endarterectomy and a right 4th toe amputation. The patient tolerated the procedure well and his toe amputation site has healed. He is remains on an Aspirin and a statin.\par \par Carotid stenosis - He denies amaurosis fugax, motor or sensory deficits.  [de-identified] : 82 yo male well known to me returns in follow up for a right heel ulceration. He is s/p bilateral femoral endarterectomies. He has been applying Bacitracin to the wound. He has completed a course of Augmentin. He denies fever or chills. He reports that the wound has been improving. He has not obtained his new shoes since his last appointment. He is ambulating minimally.

## 2022-02-28 NOTE — PHYSICAL EXAM
[JVD] : no jugular venous distention  [Carotid Bruits] : carotid bruit  [Normal Breath Sounds] : Normal breath sounds [Normal Rate and Rhythm] : normal rate and rhythm [2+] : left 2+ [0] : right 0 [1+] : left 1+ [Ankle Swelling (On Exam)] : not present [Ankle Swelling Bilaterally] : bilaterally  [Alert] : alert [Oriented to Person] : oriented to person [Oriented to Place] : oriented to place [Oriented to Time] : oriented to time [de-identified] : Awake and Alert [FreeTextEntry1] : biphasic signals right foot [de-identified] : bilateral charcot deformities, bilateral feet warm, right heel with superficial ulceration, surrounding dermatitis [de-identified] : Appropriate

## 2022-02-28 NOTE — ASSESSMENT
[FreeTextEntry1] : PAD with ulceration - The patient has a superficial  ulceration of the right heel.It has improved slightly sinc ehias last appointment. I believe the ulceration is a decubitus ulcer. I recommended that we continue  to treat the ulcer conservatively. If the wound does not heal with local wound care and offloading he will require an angiogram and possible intervention for limb salvage. \par \par Follow up in 2 weeks. \par \par

## 2022-03-28 ENCOUNTER — APPOINTMENT (OUTPATIENT)
Dept: VASCULAR SURGERY | Facility: CLINIC | Age: 82
End: 2022-03-28
Payer: MEDICARE

## 2022-03-28 VITALS — SYSTOLIC BLOOD PRESSURE: 119 MMHG | DIASTOLIC BLOOD PRESSURE: 68 MMHG | HEART RATE: 62 BPM

## 2022-03-28 PROCEDURE — 99212 OFFICE O/P EST SF 10 MIN: CPT

## 2022-03-28 RX ORDER — AMOXICILLIN AND CLAVULANATE POTASSIUM 500; 125 MG/1; MG/1
500-125 TABLET, FILM COATED ORAL
Qty: 20 | Refills: 0 | Status: DISCONTINUED | COMMUNITY
Start: 2022-02-07 | End: 2022-03-28

## 2022-03-28 NOTE — PHYSICAL EXAM
[JVD] : no jugular venous distention  [Carotid Bruits] : carotid bruit  [Normal Breath Sounds] : Normal breath sounds [Normal Rate and Rhythm] : normal rate and rhythm [2+] : right 2+ [0] : right 0 [Ankle Swelling (On Exam)] : not present [Ankle Swelling Bilaterally] : bilaterally  [Alert] : alert [Oriented to Person] : oriented to person [Oriented to Place] : oriented to place [Oriented to Time] : oriented to time [de-identified] : Awake and Alert [FreeTextEntry1] : biphasic signals right foot [de-identified] : bilateral charcot deformities, bilateral feet warm, right heel with superficial ulceration - clean and granulating [de-identified] : Appropriate

## 2022-03-28 NOTE — CONSULT LETTER
[Dear  ___] : Dear  [unfilled], [Consult Letter:] : I had the pleasure of evaluating your patient, [unfilled]. [Please see my note below.] : Please see my note below. [Consult Closing:] : Thank you very much for allowing me to participate in the care of this patient.  If you have any questions, please do not hesitate to contact me. [Sincerely,] : Sincerely, [FreeTextEntry2] : Penny So [FreeTextEntry3] : iWld Jackson MD, RPVI\par Chief, Vascular Surgery\par

## 2022-03-28 NOTE — HISTORY OF PRESENT ILLNESS
[FreeTextEntry1] : 79 yo male well known to me. The patient is s/p bilateral femoral endarterectomies and a left carotid endarterectomy. His most recent procedure was a right femoral endarterectomy and a right 4th toe amputation. The patient tolerated the procedure well and his toe amputation site has healed. He is remains on an Aspirin and a statin.\par \par Carotid stenosis - He denies amaurosis fugax, motor or sensory deficits.  [de-identified] : 82 yo male well known to me returns in follow up for a right heel ulceration. He is s/p bilateral femoral endarterectomies. He has been applying Bacitracin to the wound. He reports that the wound has been healing slowly. He reports that his podiatrist is happy with the progress. He denies fever or chills. He is ambulating minimally.

## 2022-03-28 NOTE — ASSESSMENT
[FreeTextEntry1] : PAD with ulceration - The patient has a superficial  ulceration of the right heel.It has improved since his last appointment. I believe the ulceration is a decubitus ulcer. I recommended that we continue  to treat the ulcer conservatively. If the wound does not heal with local wound care and offloading he will require an angiogram and possible intervention for limb salvage. \par \par Follow up in 3 -4  weeks. \par \par

## 2022-04-25 ENCOUNTER — APPOINTMENT (OUTPATIENT)
Dept: VASCULAR SURGERY | Facility: CLINIC | Age: 82
End: 2022-04-25
Payer: MEDICARE

## 2022-04-25 VITALS — SYSTOLIC BLOOD PRESSURE: 97 MMHG | HEART RATE: 63 BPM | DIASTOLIC BLOOD PRESSURE: 50 MMHG

## 2022-04-25 PROCEDURE — 99212 OFFICE O/P EST SF 10 MIN: CPT

## 2022-04-25 RX ORDER — LEVOTHYROXINE SODIUM 75 MCG
75 TABLET ORAL
Refills: 0 | Status: DISCONTINUED | COMMUNITY
End: 2022-04-25

## 2022-04-25 NOTE — ASSESSMENT
[FreeTextEntry1] : PAD with ulceration - The patient has a superficial  ulceration of the right heel.It has improved since his last appointment. It is almost healed. The patient will follow up with his podiatrist in several weeks. He will follow up with me in 6 months sooner if the wound worsens. \par \par Continue to offload the wound\par Bacitracin to the wound daily\par \par \par Follow up in 6 months

## 2022-04-25 NOTE — PHYSICAL EXAM
[JVD] : no jugular venous distention  [Carotid Bruits] : carotid bruit  [Normal Breath Sounds] : Normal breath sounds [Normal Rate and Rhythm] : normal rate and rhythm [2+] : right 2+ [0] : right 0 [Ankle Swelling (On Exam)] : not present [Ankle Swelling Bilaterally] : bilaterally  [Alert] : alert [Oriented to Person] : oriented to person [Oriented to Place] : oriented to place [Oriented to Time] : oriented to time [de-identified] : Awake and Alert [de-identified] : right heel with superficial ulceration - almost healed now approximately .2 X .1 - granulating [FreeTextEntry1] : biphasic signals right foot [de-identified] : Appropriate

## 2022-04-25 NOTE — HISTORY OF PRESENT ILLNESS
[FreeTextEntry1] : 81 yo male well known to me. The patient is s/p bilateral femoral endarterectomies and a left carotid endarterectomy. His most recent procedure was a right femoral endarterectomy and a right 4th toe amputation. The patient tolerated the procedure well and his toe amputation site has healed. He is remains on an Aspirin and a statin.\par \par Carotid stenosis - He denies amaurosis fugax, motor or sensory deficits.  [de-identified] : 82 yo male well known to me returns in follow up for a right heel ulceration. He is s/p bilateral femoral endarterectomies. He has been applying Bacitracin to the wound. He reports that the wound has healed. He reports that his podiatrist is happy with the progress. He denies fever or chills. He is ambulating minimally. He obtained new shoes.

## 2022-05-24 ENCOUNTER — APPOINTMENT (OUTPATIENT)
Dept: VASCULAR SURGERY | Facility: HOSPITAL | Age: 82
End: 2022-05-24

## 2022-05-28 ENCOUNTER — TRANSCRIPTION ENCOUNTER (OUTPATIENT)
Age: 82
End: 2022-05-28

## 2022-06-29 ENCOUNTER — TRANSCRIPTION ENCOUNTER (OUTPATIENT)
Age: 82
End: 2022-06-29

## 2022-09-14 ENCOUNTER — APPOINTMENT (OUTPATIENT)
Dept: SURGERY | Facility: CLINIC | Age: 82
End: 2022-09-14

## 2022-09-14 ENCOUNTER — RESULT REVIEW (OUTPATIENT)
Age: 82
End: 2022-09-14

## 2022-09-21 ENCOUNTER — APPOINTMENT (OUTPATIENT)
Dept: SURGERY | Facility: CLINIC | Age: 82
End: 2022-09-21

## 2022-09-28 ENCOUNTER — APPOINTMENT (OUTPATIENT)
Dept: SURGERY | Facility: CLINIC | Age: 82
End: 2022-09-28

## 2022-10-05 ENCOUNTER — APPOINTMENT (OUTPATIENT)
Dept: SURGERY | Facility: CLINIC | Age: 82
End: 2022-10-05

## 2022-10-12 ENCOUNTER — APPOINTMENT (OUTPATIENT)
Dept: SURGERY | Facility: CLINIC | Age: 82
End: 2022-10-12

## 2022-10-19 ENCOUNTER — RESULT REVIEW (OUTPATIENT)
Age: 82
End: 2022-10-19

## 2022-10-19 ENCOUNTER — APPOINTMENT (OUTPATIENT)
Dept: SURGERY | Facility: CLINIC | Age: 82
End: 2022-10-19

## 2022-10-20 ENCOUNTER — NON-APPOINTMENT (OUTPATIENT)
Age: 82
End: 2022-10-20

## 2022-10-24 ENCOUNTER — APPOINTMENT (OUTPATIENT)
Dept: VASCULAR SURGERY | Facility: CLINIC | Age: 82
End: 2022-10-24

## 2022-10-24 VITALS — SYSTOLIC BLOOD PRESSURE: 141 MMHG | HEART RATE: 57 BPM | DIASTOLIC BLOOD PRESSURE: 58 MMHG

## 2022-10-24 DIAGNOSIS — I74.09 OTHER ARTERIAL EMBOLISM AND THROMBOSIS OF ABDOMINAL AORTA: ICD-10-CM

## 2022-10-24 DIAGNOSIS — I72.4 ANEURYSM OF ARTERY OF LOWER EXTREMITY: ICD-10-CM

## 2022-10-24 PROCEDURE — 93926 LOWER EXTREMITY STUDY: CPT

## 2022-10-24 PROCEDURE — 99213 OFFICE O/P EST LOW 20 MIN: CPT

## 2022-10-24 NOTE — ASSESSMENT
[FreeTextEntry1] : 82 yo male with multiple medical problems. He is s/p repair of a femoral artery dehiscence with a viabahn stent graft. He underwent an arterial duplex which demonstrated an excluded aneurysm and normal flow in the right lower extremity. He has what appears to be a venous ulcer on the right shin and a decubitus ulcer of the right foot. I recommended that he continue local wound care in the [facility]. He will follow up in several months sooner if the wounds worsen.

## 2022-10-24 NOTE — REVIEW OF SYSTEMS
[Fever] : no fever [Chills] : no chills [Limb Pain] : no limb pain [Limb Swelling] : no limb swelling [Skin Wound] : skin wound [Difficulty Walking] : difficulty walking

## 2022-10-24 NOTE — HISTORY OF PRESENT ILLNESS
[FreeTextEntry1] : 80 yo male well known to me. He is most recently s/p repair of a femoral artery dehiscence with a viabahn stent graft. He reports that he developed a wound on his right shin and is undergoing local wound care at the [facility] wound care clinic. He reports that his wound is slowly improving. He denies fever or chills.

## 2022-10-26 ENCOUNTER — APPOINTMENT (OUTPATIENT)
Dept: SURGERY | Facility: CLINIC | Age: 82
End: 2022-10-26

## 2022-11-29 ENCOUNTER — RESULT REVIEW (OUTPATIENT)
Age: 82
End: 2022-11-29

## 2022-11-29 ENCOUNTER — APPOINTMENT (OUTPATIENT)
Dept: SURGERY | Facility: CLINIC | Age: 82
End: 2022-11-29

## 2022-12-04 ENCOUNTER — TRANSCRIPTION ENCOUNTER (OUTPATIENT)
Age: 82
End: 2022-12-04

## 2022-12-28 ENCOUNTER — APPOINTMENT (OUTPATIENT)
Dept: SURGERY | Facility: CLINIC | Age: 82
End: 2022-12-28

## 2023-01-04 ENCOUNTER — APPOINTMENT (OUTPATIENT)
Dept: SURGERY | Facility: CLINIC | Age: 83
End: 2023-01-04

## 2023-01-15 ENCOUNTER — TRANSCRIPTION ENCOUNTER (OUTPATIENT)
Age: 83
End: 2023-01-15

## 2023-01-17 ENCOUNTER — TRANSCRIPTION ENCOUNTER (OUTPATIENT)
Age: 83
End: 2023-01-17

## 2023-01-23 ENCOUNTER — APPOINTMENT (OUTPATIENT)
Dept: VASCULAR SURGERY | Facility: CLINIC | Age: 83
End: 2023-01-23

## 2023-01-26 ENCOUNTER — APPOINTMENT (OUTPATIENT)
Dept: SURGERY | Facility: CLINIC | Age: 83
End: 2023-01-26

## 2023-02-17 ENCOUNTER — APPOINTMENT (OUTPATIENT)
Dept: VASCULAR SURGERY | Facility: CLINIC | Age: 83
End: 2023-02-17

## 2023-03-15 ENCOUNTER — APPOINTMENT (OUTPATIENT)
Dept: VASCULAR SURGERY | Facility: CLINIC | Age: 83
End: 2023-03-15
Payer: MEDICARE

## 2023-03-15 PROCEDURE — 99214 OFFICE O/P EST MOD 30 MIN: CPT

## 2023-05-30 ENCOUNTER — APPOINTMENT (OUTPATIENT)
Dept: SURGERY | Facility: CLINIC | Age: 83
End: 2023-05-30

## 2023-06-14 ENCOUNTER — APPOINTMENT (OUTPATIENT)
Dept: VASCULAR SURGERY | Facility: CLINIC | Age: 83
End: 2023-06-14
Payer: MEDICARE

## 2023-06-14 PROCEDURE — 99214 OFFICE O/P EST MOD 30 MIN: CPT

## 2023-06-22 ENCOUNTER — APPOINTMENT (OUTPATIENT)
Dept: SURGERY | Facility: CLINIC | Age: 83
End: 2023-06-22

## 2023-07-06 ENCOUNTER — TRANSCRIPTION ENCOUNTER (OUTPATIENT)
Age: 83
End: 2023-07-06

## 2023-07-14 ENCOUNTER — APPOINTMENT (OUTPATIENT)
Dept: PAIN MANAGEMENT | Facility: CLINIC | Age: 83
End: 2023-07-14
Payer: MEDICARE

## 2023-07-14 VITALS
OXYGEN SATURATION: 100 % | BODY MASS INDEX: 28.93 KG/M2 | WEIGHT: 180 LBS | HEART RATE: 62 BPM | SYSTOLIC BLOOD PRESSURE: 130 MMHG | HEIGHT: 66 IN | DIASTOLIC BLOOD PRESSURE: 58 MMHG

## 2023-07-14 DIAGNOSIS — M54.16 RADICULOPATHY, LUMBAR REGION: ICD-10-CM

## 2023-07-14 PROCEDURE — 99203 OFFICE O/P NEW LOW 30 MIN: CPT

## 2023-07-14 RX ORDER — GABAPENTIN 100 MG/1
100 CAPSULE ORAL
Qty: 90 | Refills: 2 | Status: ACTIVE | COMMUNITY
Start: 2023-07-14 | End: 1900-01-01

## 2023-07-14 NOTE — PHYSICAL EXAM
[de-identified] : General: Well-developed and well-nourished.  No acute distress.\par Psychiatric: Behavior was cooperative  \par Head:  Normocephalic and atraumatic\par Eyes:  Sclera white. Conjunctiva and eyelids pink and moist without discharge.\par Cardiovascular:  Regular\par Respiratory:  Trachea midline. Normal effort.\par No accessory muscle use with respiration\par Abdomen: Non distended, soft and nontender\par Skin:  Venous ulcer right shin\par Back  There is no pain with extension,   ROM wnl\par Extremities: +2 edema, ulceraton about the medial malleolus\par Right lower extremity pes planus with rocker-bottom deformity.\par Musculoskeletal: Moving all extremities freely \par Neuro: CN 2-12 Grossly intact\par Sensation to light touch is intact in all extremities\par Gait: Ambulates with RW

## 2023-07-14 NOTE — HISTORY OF PRESENT ILLNESS
[Back Pain] : back pain [FreeTextEntry1] : HPI - Mr. DONA GUEVARA is a 82 year M with PHx CAD as below, referred by Dr Brantley who presents today with chief complaint of low back and right leg pain. Reports that it developed over a year ago/ It is located right anterior shin;  there is radiation of the pain into the heelThe pain is presently 8/10 in severity on the numerical rating scale. It is sharp and bunring in nature. The pain is constant. There is diurnal worsening, during the night. The pain is aggravated with position, the leg being on the ground. The pain improves with rest. The pain is functionally and emotionally disabling for the patient as its preventing them from going about activities of daily living, such as routine housework. The pain does impair the patient’s ability to sleep. \par \par Patient attests to  6 weeks of provider directed treatment, including a provider directed stretching regimen.\par Patient reports treatment that occurred within the last 3 months\par Patient report that symptoms have been persistent and and progressing after treatment\par  \par Reports there is NO present numbness, there is NO weakness. Patient denies any bowel/bladder incontinence, no saddle/perineal anesthesia or any other red flag signs or symptoms. Reports regular BMs.\par

## 2023-07-14 NOTE — PHYSICAL EXAM
[de-identified] : General: Well-developed and well-nourished.  No acute distress.\par Psychiatric: Behavior was cooperative  \par Head:  Normocephalic and atraumatic\par Eyes:  Sclera white. Conjunctiva and eyelids pink and moist without discharge.\par Cardiovascular:  Regular\par Respiratory:  Trachea midline. Normal effort.\par No accessory muscle use with respiration\par Abdomen: Non distended, soft and nontender\par Skin:  Venous ulcer right shin\par Back  There is no pain with extension,   ROM wnl\par Extremities: +2 edema, ulceraton about the medial malleolus\par Right lower extremity pes planus with rocker-bottom deformity.\par Musculoskeletal: Moving all extremities freely \par Neuro: CN 2-12 Grossly intact\par Sensation to light touch is intact in all extremities\par Gait: Ambulates with RW

## 2023-07-14 NOTE — DATA REVIEWED
[FreeTextEntry1] : See Media\par \par Right foot MRI\par January 11, 2023\par Impression,\par Focal acute osteomyelitis along the medial margin of the right talar head and neck junction with small adjacent deep soft tissue ulceration.  Background severe posttraumatic pes planus with rocker-bottom deformity and advanced calcaneofibular hindfoot impingement\par \par X-ray tibia-fibula right 2 views\par Osteoarthritis and ORIF changes.  No radiographic evidence of osteomyelitis.

## 2023-07-14 NOTE — ASSESSMENT
[FreeTextEntry1] : Mr. DONA GUEVARA is a 82 year M suffering from lower extremity that based upon today's subjective complaints, physical examination, and chart review, is likely multifactorial with likely element of neuropathic pain\par \par >> Medications\par \par Chronic opioid use for non-malignant pain, in particular at high doses would not be recommended since it can potentially lead to hyperalgesia (hypersensitivity), tolerance and addiction. \par  \par Consider for palliative pain management evaluation, Dr Joseph\par \par Trial Neurontin 100 mg po , titrate to TID as tolerated\par \par Consider low dose Cymbalta\par  \par >> Interventions\par  \par None indicated at this time\par  \par >> Therapy and Other Modalities\par  \par Continue with daily home stretching regimen\par \par >> Imaging and Other Studies\par \par See Media \par \par >> Consults\par \par F/u Wound care, Vascular Sx as scheduled\par

## 2023-07-14 NOTE — PHYSICAL EXAM
[de-identified] : General: Well-developed and well-nourished.  No acute distress.\par Psychiatric: Behavior was cooperative  \par Head:  Normocephalic and atraumatic\par Eyes:  Sclera white. Conjunctiva and eyelids pink and moist without discharge.\par Cardiovascular:  Regular\par Respiratory:  Trachea midline. Normal effort.\par No accessory muscle use with respiration\par Abdomen: Non distended, soft and nontender\par Skin:  Venous ulcer right shin\par Back  There is no pain with extension,   ROM wnl\par Extremities: +2 edema, ulceraton about the medial malleolus\par Right lower extremity pes planus with rocker-bottom deformity.\par Musculoskeletal: Moving all extremities freely \par Neuro: CN 2-12 Grossly intact\par Sensation to light touch is intact in all extremities\par Gait: Ambulates with RW

## 2023-07-17 ENCOUNTER — APPOINTMENT (OUTPATIENT)
Dept: VASCULAR SURGERY | Facility: CLINIC | Age: 83
End: 2023-07-17
Payer: MEDICARE

## 2023-07-17 VITALS — HEART RATE: 71 BPM | SYSTOLIC BLOOD PRESSURE: 118 MMHG | DIASTOLIC BLOOD PRESSURE: 81 MMHG

## 2023-07-17 PROCEDURE — 99213 OFFICE O/P EST LOW 20 MIN: CPT

## 2023-07-17 NOTE — ASSESSMENT
[FreeTextEntry1] : 83 yo male with multiple medical problems. He is s/p repair of a femoral artery dehiscence with a viabahn stent graft. He has to be a venous ulcer on the right shin and a hypergranulated ulceration of the right foot. Silver nitrate was applied to the hyper-granulated areas. His right lower extremity was wrapped with Kerlix and an Ace wrap. I recommended he wear compression to manage his stasis dermatitis and change his right shoe to alleviate the pressure on his right foot. Continue local wound care in the [facility].\par \par He will follow up in August for arterial  testing.

## 2023-07-17 NOTE — REVIEW OF SYSTEMS
[Skin Wound] : skin wound [Difficulty Walking] : difficulty walking [Fever] : no fever [Chills] : no chills [Lower Ext Edema] : no extremity edema [Limb Pain] : no limb pain [Limb Swelling] : no limb swelling

## 2023-07-17 NOTE — HISTORY OF PRESENT ILLNESS
[FreeTextEntry1] : 82 yo male well known to me. He is most recently s/p repair of a femoral artery dehiscence with a viabahn stent graft. He reports that he developed a wound on his right shin and is undergoing local wound care at the [facility] wound care clinic. He reports that his wound is slowly improving. He denies fever or chills.  [de-identified] : 82 year-old male returns in follow up. The patient has a complex vascular history. He has a right medial ankle wound which has healed intermittently. He has been followed by  radha Jeff and  for wound care. He reports that he recently developed increased pain in the right leg. He was seen by  Dr. Cervantes for pain management. He currently takes Gabapentin with improvement in his symptoms. He walks minimally.

## 2023-07-17 NOTE — PHYSICAL EXAM
[Normal Breath Sounds] : Normal breath sounds [Ankle Swelling Bilaterally] : bilaterally  [Alert] : alert [Oriented to Person] : oriented to person [Oriented to Place] : oriented to place [Oriented to Time] : oriented to time [JVD] : no jugular venous distention  [Ankle Swelling (On Exam)] : not present [de-identified] : Awake and Alert [FreeTextEntry1] : biphasic Dp signal right [de-identified] :  superficial ulceration of the right shin, hypergranulated ulceration of the right medial ankle, severe charcot deformity of the right foot  [de-identified] : No gross motor or sensory deficits. [de-identified] : Appropriate affect.

## 2023-07-31 ENCOUNTER — APPOINTMENT (OUTPATIENT)
Dept: VASCULAR SURGERY | Facility: CLINIC | Age: 83
End: 2023-07-31
Payer: MEDICARE

## 2023-07-31 VITALS — HEART RATE: 60 BPM | DIASTOLIC BLOOD PRESSURE: 72 MMHG | SYSTOLIC BLOOD PRESSURE: 127 MMHG

## 2023-07-31 PROCEDURE — 99213 OFFICE O/P EST LOW 20 MIN: CPT

## 2023-07-31 PROCEDURE — 93922 UPR/L XTREMITY ART 2 LEVELS: CPT

## 2023-07-31 NOTE — DATA REVIEWED
[FreeTextEntry1] : NEGRITO/PVR testing - personally reviewed -  elevated ABIs bl, PVRs at the ankle mild disease

## 2023-07-31 NOTE — HISTORY OF PRESENT ILLNESS
[FreeTextEntry1] : 80 yo male well known to me. He is most recently s/p repair of a femoral artery dehiscence with a viabahn stent graft. He reports that he developed a wound on his right shin and is undergoing local wound care at the [facility] wound care clinic. He reports that his wound is slowly improving. He denies fever or chills.  [de-identified] : 82-year-old male returns in follow up. The patient has a complex vascular history. He has a right medial ankle wound which has healed intermittently. He continues to be followed by  and  for wound care. He continues to develop multiple superficial ulcerations of the right shin and ankle. He continues to take Gabapentin with improvement in his symptoms. He walks minimally. He presents for arterial testing.

## 2023-07-31 NOTE — PHYSICAL EXAM
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 [de-identified] : Appropriate affect.

## 2023-07-31 NOTE — ASSESSMENT
[FreeTextEntry1] : 83 yo male with multiple medical problems. He is s/p repair of a femoral artery dehiscence with a viabahn stent graft. He continues to have superficial venous ulcerations on the right shin and a hypergranulated ulceration of the right medial ankle. He underwent arterial testing that demonstrated mild disease. I do not think the patient's ulcerations are secondary to arterial disease.    I recommended he continue compression to manage his stasis dermatitis and change his right shoe to alleviate the pressure on his right foot. Continue local wound care in the [facility].  He will follow up in a few weeks for reassessment of his wounds.

## 2023-08-03 RX ORDER — MIDODRINE HYDROCHLORIDE 5 MG/1
5 TABLET ORAL
Qty: 60 | Refills: 2 | Status: COMPLETED | COMMUNITY
Start: 2020-09-23 | End: 2023-08-03

## 2023-08-03 RX ORDER — OXYCODONE AND ACETAMINOPHEN 5; 325 MG/1; MG/1
5-325 TABLET ORAL EVERY 4 HOURS
Qty: 45 | Refills: 0 | Status: COMPLETED | COMMUNITY
Start: 2022-03-28 | End: 2023-08-03

## 2023-08-03 RX ORDER — METFORMIN HYDROCHLORIDE 500 MG/1
500 TABLET, FILM COATED, EXTENDED RELEASE ORAL
Refills: 0 | Status: COMPLETED | COMMUNITY
End: 2023-08-03

## 2023-08-03 RX ORDER — OXYCODONE AND ACETAMINOPHEN 5; 325 MG/1; MG/1
5-325 TABLET ORAL EVERY 8 HOURS
Qty: 8 | Refills: 0 | Status: COMPLETED | COMMUNITY
Start: 2022-02-10 | End: 2023-08-03

## 2023-08-03 RX ORDER — OXYCODONE AND ACETAMINOPHEN 5; 325 MG/1; MG/1
5-325 TABLET ORAL
Qty: 24 | Refills: 0 | Status: COMPLETED | COMMUNITY
Start: 2022-11-09 | End: 2023-08-03

## 2023-08-04 ENCOUNTER — APPOINTMENT (OUTPATIENT)
Dept: VASCULAR SURGERY | Facility: CLINIC | Age: 83
End: 2023-08-04

## 2023-08-28 ENCOUNTER — APPOINTMENT (OUTPATIENT)
Dept: VASCULAR SURGERY | Facility: CLINIC | Age: 83
End: 2023-08-28
Payer: MEDICARE

## 2023-08-28 VITALS — SYSTOLIC BLOOD PRESSURE: 136 MMHG | HEART RATE: 58 BPM | DIASTOLIC BLOOD PRESSURE: 56 MMHG

## 2023-08-28 DIAGNOSIS — L97.409 OTHER SPECIFIED DIABETES MELLITUS WITH FOOT ULCER: ICD-10-CM

## 2023-08-28 DIAGNOSIS — E13.621 OTHER SPECIFIED DIABETES MELLITUS WITH FOOT ULCER: ICD-10-CM

## 2023-08-28 PROCEDURE — 99213 OFFICE O/P EST LOW 20 MIN: CPT

## 2023-08-28 NOTE — PHYSICAL EXAM
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 [de-identified] : Appropriate affect.

## 2023-08-28 NOTE — HISTORY OF PRESENT ILLNESS
[FreeTextEntry1] : 82 yo male well known to me. He is most recently s/p repair of a femoral artery dehiscence with a viabahn stent graft. He reports that he developed a wound on his right shin and is undergoing local wound care at the [facility] wound care clinic. He reports that his wound is slowly improving. He denies fever or chills.  [de-identified] : 82-year-old male returns in follow up. The patient has a complex vascular history. He has a right medial ankle wound which has healed intermittently. He continues to be followed by  and  for wound care. He continues to develop multiple superficial ulcerations of the right shin. His right medial ankle wound continues to remain clean. He walks minimally.

## 2023-08-28 NOTE — ASSESSMENT
[FreeTextEntry1] : 83 yo male with multiple medical problems. He is s/p repair of a femoral artery dehiscence with a viabahn stent graft. He continues to have superficial venous ulcerations on the right shin and a hypergranulated ulceration of the right medial ankle. I recommended he change his treatment regimen, as his wounds are not healing. I do not think the patient's ulcerations are secondary to arterial disease. His leg was wrapped in Kerlix in office.  I recommended he continue compression to manage his stasis dermatitis. Continue local wound care in the [facility]. I discussed the case with Dr. Panda who agrees that it would be reasonable to change the treatment regiment.   He will follow up in a few weeks for reassessment of his wounds.

## 2023-10-03 ENCOUNTER — APPOINTMENT (OUTPATIENT)
Dept: SURGERY | Facility: CLINIC | Age: 83
End: 2023-10-03

## 2023-10-23 ENCOUNTER — APPOINTMENT (OUTPATIENT)
Dept: VASCULAR SURGERY | Facility: CLINIC | Age: 83
End: 2023-10-23
Payer: MEDICARE

## 2023-10-23 VITALS — SYSTOLIC BLOOD PRESSURE: 110 MMHG | DIASTOLIC BLOOD PRESSURE: 66 MMHG | HEIGHT: 66 IN | HEART RATE: 71 BPM

## 2023-10-23 DIAGNOSIS — L97.512 NON-PRESSURE CHRONIC ULCER OF OTHER PART OF RIGHT FOOT WITH FAT LAYER EXPOSED: ICD-10-CM

## 2023-10-23 DIAGNOSIS — I70.209 UNSPECIFIED ATHEROSCLEROSIS OF NATIVE ARTERIES OF EXTREMITIES, UNSPECIFIED EXTREMITY: ICD-10-CM

## 2023-10-23 PROCEDURE — 99213 OFFICE O/P EST LOW 20 MIN: CPT

## 2023-10-24 NOTE — ASSESSMENT
Floor [FreeTextEntry1] : Mr. DONA GUEVARA is a 82 year M suffering from lower extremity that based upon today's subjective complaints, physical examination, and chart review, is likely multifactorial with likely element of neuropathic pain\par \par >> Medications\par \par Chronic opioid use for non-malignant pain, in particular at high doses would not be recommended since it can potentially lead to hyperalgesia (hypersensitivity), tolerance and addiction. \par  \par Consider for palliative pain management evaluation, Dr Joseph\par \par Trial Neurontin 100 mg po , titrate to TID as tolerated\par \par Consider low dose Cymbalta\par  \par >> Interventions\par  \par None indicated at this time\par  \par >> Therapy and Other Modalities\par  \par Continue with daily home stretching regimen\par \par >> Imaging and Other Studies\par \par See Media \par \par >> Consults\par \par F/u Wound care, Vascular Sx as scheduled\par  None known

## 2024-04-28 NOTE — DISCUSSION/SUMMARY
[FreeTextEntry1] : 79 yo male s/p a left carotid endarterectomy for symptomatic carotid disease. His incision is healing appropriately. He is taking an Aspirin daily. [facility]. I do not think there is a contraindication to restarting pulmonary rehab next week.\par \par Follow up in 1 month. no known precautions/limitations

## 2024-06-04 NOTE — PHYSICAL EXAM
Patient was signed out to me by the outgoing physician.    Observation Note    The patient was signed out to me by Dr Rivera on 06/04/24 at 12:31 AM    Currently, the patient is in no apparent distress and vital signs are stable. Observation continues. No additional studies are required at this time and the patient will continue to be monitored until a bed is ready and the patient is able to be transferred.     The patient is being signed out to my colleague, Dr Alvarado, on06/04/24 at 6:15 AM:        The case was reviewed with the patient. Nursing notes reviewed.    Results for orders placed or performed during the hospital encounter of 06/03/24   Comprehensive Metabolic Panel   Result Value    Fasting Status     Sodium 136    Potassium 4.3    Chloride 96 (L)    Carbon Dioxide 26    Anion Gap 18    Glucose 243 (H)    BUN 12    Creatinine 0.63 (L)    Glomerular Filtration Rate >90     Comment: eGFR results = or >60 mL/min/1.73m2 = Normal kidney function. Estimated GFR calculated using the CKD-EPI-R (2021) equation that does not include race in the creatinine calculation.    BUN/Cr 19    Calcium 9.0    Bilirubin, Total 0.4    GOT/ (H)    GPT/ (H)    Alkaline Phosphatase 119 (H)    Albumin 4.1    Protein, Total 7.9    Globulin 3.8    A/G Ratio 1.1   Magnesium   Result Value    Magnesium 1.8   Thyroid Stimulating Hormone Reflex   Result Value    TSH 2.489     Comment: Findings most consistent with euthyroid state, no additional testing suggested. TSH may be normal in patients with thyroid dysfunction and pituitary disease. Clinical correlation recommended.    (Reflex TSH algorithm is not recommended in hospitalized patients. A variety of drugs, as well as serious acute and chronic illnesses may alter thyroid function tests. Commonly implicated drugs include glucocorticoids, dopamine, carbamazepine, iodine, amiodarone, lithium and heparin.)   Acetaminophen Level   Result Value    Acetaminophen <2 (L)    Salicylate Level   Result Value    Salicylate <2.8   Drug Abuse Screen, Urine   Result Value    Amphetamines, Urine Negative     Comment: Cutoff = 500 ng/mL    Barbiturates, Urine Negative     Comment: Cutoff = 200 ng/mL    Benzodiazepines, Urine Negative     Comment: Cutoff = 200 ng/mL    Cocaine/ Metabolite, Urine Negative     Comment: Cutoff = 150 ng/ml    Opiates, Urine Negative     Comment: Cutoff = 300 ng/mL    Phencyclidine, Urine Negative     Comment: Cutoff = 25 ng/mL    Cannabinoids, Urine Negative     Comment: Cutoff = 50 ng/mL    Fentanyl, Urine Screen Negative     Comment: Cutoff = 1.0 ng/mL   Alcohol   Result Value    Alcohol 266 (H)   Urinalysis With Microscopy & Culture If Indicated   Result Value    COLOR, URINALYSIS Straw    APPEARANCE, URINALYSIS Clear    GLUCOSE, URINALYSIS >1000 (A)    BILIRUBIN, URINALYSIS Negative    KETONES, URINALYSIS Trace (A)    SPECIFIC GRAVITY, URINALYSIS 1.021     Comment: Measured by refractometry    OCCULT BLOOD, URINALYSIS Trace (A)    PH, URINALYSIS 6.0    PROTEIN, URINALYSIS 100 (A)    UROBILINOGEN, URINALYSIS 0.2    NITRITE, URINALYSIS Negative    LEUKOCYTE ESTERASE, URINALYSIS Negative    SQUAMOUS EPITHELIAL, URINALYSIS None Seen    ERYTHROCYTES, URINALYSIS 1 to 2    LEUKOCYTES, URINALYSIS 1 to 5    BACTERIA, URINALYSIS None Seen    HYALINE CASTS, URINALYSIS None Seen    MUCUS Present   CBC with Automated Differential (performable only)   Result Value    WBC 6.1    RBC 4.54    HGB 11.6 (L)    HCT 36.6 (L)    MCV 80.6    MCH 25.6 (L)    MCHC 31.7 (L)    RDW-CV 17.2 (H)    RDW-SD 50.1 (H)        NRBC 0    Neutrophil, Percent 44    Lymphocytes, Percent 43    Mono, Percent 9    Eosinophils, Percent 2    Basophils, Percent 1    Immature Granulocytes 1    Absolute Neutrophils 2.7    Absolute Lymphocytes 2.6    Absolute Monocytes 0.6    Absolute Eosinophils  0.1    Absolute Basophils 0.1    Absolute Immature Granulocytes 0.0   Rapid SARS-CoV-2 by PCR     Specimen: Nasal, Mid-turbinate; Swab   Result Value    Rapid SARS-COV-2 by PCR Not Detected    Isolation Guidelines      Comment: Do not use this test result as the sole decision-maker for discontinuation of isolation.   Clinical evaluation should be considered for other respiratory illness requiring transmission-based isolation.    -    No fever (<99.0 F/37.2 C) for at least 24 hours without the use of fever-reducing medications    AND  -    Respiratory symptoms have improved or resolved (e.g. cough, shortness of breath)     AND  -    COVID-19 negative test    See COVID-19 Deisolation Resource Guide    Procedural Comment      Comment: SARS-CoV-2 nucleic acid has not been detected.     Testing was performed using the [employer] Xpert RT-PCR assay that has been given Emergency Use Authorization (EUA) by the [place] Food and Drug Administration (FDA). These results are considered definitive and do not need to be confirmed by another method.       Imaging Results              CT ABDOMEN PELVIS W CONTRAST (Final result)  Result time 06/03/24 21:34:36      Final result                   Impression:        1. No acute findings in the abdomen or pelvis.  2. Enlarged steatotic liver.  3. Uncomplicated cholelithiasis.  4. Prostatomegaly.    Electronically Signed by: KAI SUAREZ M.D.   Signed on: 6/3/2024 9:34 PM   Workstation ID: [id number]               Narrative:    Examination: CT abdomen and pelvis with contrast.    Clinical Information: Abdominal pain    Comparison: Incision pelvis 4/12/2023    Technique:    IV contrast: The dose and type of IV contrast utilized for this  examination are recorded in the imaging encounter of the patient's medical  record.    Oral contrast: None.    Technical comments: Standard technique.    Dose reduction: This CT exam was performed using one or more of the  following dose-reduction techniques: Automated exposure control, adjustment  of the mA and/or kV according to patient  size, and/or use of iterative  reconstruction technique.    Findings:    LOWER CHEST       Heart is normal in size. Lung bases are clear. No pleural or pericardial  effusions.    UPPER ABDOMEN      Liver and bile ducts: Enlarged with extension of the right margin below  the right kidney and into the upper pelvis. Diffuse hypoattenuation of the  liver compatible with steatosis. No focal liver lesion. Portal vein and  hepatic veins are patent. No biliary dilatation.      Gallbladder: Layering radiodense gallstones. No gallbladder wall  thickening or pericholecystic fluid.      Pancreas: Normal.      Spleen: Normal.    RETROPERITONEUM      Adrenals: Normal.      Kidneys: Normal.      Lymph nodes: No lymphadenopathy in the abdomen or pelvis.     BOWEL AND PERITONEUM      Bowel: Postsurgical changes of Alexis-en-Y gastric bypass. No evident  complication. Bowel is normal in caliber and wall thickness. Diverticulosis  without evidence of acute diverticulitis. Normal appendix.      Free air or fluid: None.     VASCULATURE      Visceral arteries and portal venous system are normally patent.    PELVIS      Prostatomegaly. Normal urinary bladder.    BONES AND SOFT TISSUES      No significant lesion.                                        Vitals:    06/03/24 1925 06/03/24 2025 06/03/24 2245 06/04/24 0000   BP: 138/88 (!) 129/95 133/68 134/83   BP Location:       Patient Position:       Pulse: (!) 101 99 86 84   Resp: 18 18 16 18   Temp:       SpO2: 97% 96% 97% 93%   Weight:       Height:           ED Course as of 06/04/24 0615   Mon Jun 03, 2024 2006 CBC with anemia.  CMP with hyperglycemia.  LFTs slightly increased from previous.  Negative Tylenol salicylate level.  Normal TSH.  Normal magnesium.  Elevated alcohol level.  Pending sober reevaluation [AB]   2233 CT with no acute finding.  Enlarged steatotic liver.  Uncomplicated cholelithiasis [AB]   Tue Jun 04, 2024   0608 Patient is a calm cooperative here.  He has been  evaluated by  who felt that the patient would need inpatient treatment.  He was endorsed to Dr. Alvarado at change of shift. [GT]      ED Course User Index  [AB] Harvey Rivera MD  [GT] Harman Gonzalez MD        ED Diagnoses       Diagnosis Comment Associated Orders       Final diagnoses    Hepatic steatosis -- --    Abdominal pain, RLQ -- --    Alcoholic intoxication without complication (CMD) -- --    Suicide ideation -- --    Calculus of gallbladder without cholecystitis without obstruction -- --             No follow-up provider specified.       Harman Gonzalez MD  06/04/24 0607     [JVD] : no jugular venous distention  [Carotid Bruits] : carotid bruit  [Normal Breath Sounds] : Normal breath sounds [Normal Rate and Rhythm] : normal rate and rhythm [2+] : left 2+ [0] : right 0 [1+] : left 1+ [Ankle Swelling (On Exam)] : not present [Ankle Swelling Bilaterally] : bilaterally  [Alert] : alert [Oriented to Person] : oriented to person [Oriented to Place] : oriented to place [Oriented to Time] : oriented to time [de-identified] : Awake and Alert [FreeTextEntry1] : biphasic signals right foot [de-identified] : bilateral charcot deformities, bilateral feet warm, right heel with superficial ulceration [de-identified] : Appropriate

## 2024-11-12 ENCOUNTER — RESULT REVIEW (OUTPATIENT)
Age: 84
End: 2024-11-12

## 2024-11-19 ENCOUNTER — TRANSCRIPTION ENCOUNTER (OUTPATIENT)
Age: 84
End: 2024-11-19